# Patient Record
Sex: MALE | Race: WHITE | NOT HISPANIC OR LATINO | Employment: FULL TIME
[De-identification: names, ages, dates, MRNs, and addresses within clinical notes are randomized per-mention and may not be internally consistent; named-entity substitution may affect disease eponyms.]

---

## 2022-02-05 ENCOUNTER — HEALTH MAINTENANCE LETTER (OUTPATIENT)
Age: 37
End: 2022-02-05

## 2022-03-30 ENCOUNTER — OFFICE VISIT (OUTPATIENT)
Dept: FAMILY MEDICINE | Facility: CLINIC | Age: 37
End: 2022-03-30
Payer: COMMERCIAL

## 2022-03-30 VITALS
OXYGEN SATURATION: 96 % | WEIGHT: 183.6 LBS | SYSTOLIC BLOOD PRESSURE: 128 MMHG | HEART RATE: 86 BPM | HEIGHT: 72 IN | BODY MASS INDEX: 24.87 KG/M2 | DIASTOLIC BLOOD PRESSURE: 80 MMHG

## 2022-03-30 DIAGNOSIS — Z11.4 SCREENING FOR HIV (HUMAN IMMUNODEFICIENCY VIRUS): ICD-10-CM

## 2022-03-30 DIAGNOSIS — F10.10 ALCOHOL USE DISORDER, MILD, ABUSE: ICD-10-CM

## 2022-03-30 DIAGNOSIS — Z13.220 SCREENING FOR HYPERLIPIDEMIA: ICD-10-CM

## 2022-03-30 DIAGNOSIS — Z11.59 NEED FOR HEPATITIS C SCREENING TEST: ICD-10-CM

## 2022-03-30 DIAGNOSIS — Z00.00 ROUTINE GENERAL MEDICAL EXAMINATION AT A HEALTH CARE FACILITY: Primary | ICD-10-CM

## 2022-03-30 DIAGNOSIS — Z13.1 SCREENING FOR DIABETES MELLITUS: ICD-10-CM

## 2022-03-30 DIAGNOSIS — F17.210 CIGARETTE NICOTINE DEPENDENCE WITHOUT COMPLICATION: ICD-10-CM

## 2022-03-30 LAB
ANION GAP SERPL CALCULATED.3IONS-SCNC: 9 MMOL/L (ref 5–18)
BUN SERPL-MCNC: 6 MG/DL (ref 8–22)
CALCIUM SERPL-MCNC: 10 MG/DL (ref 8.5–10.5)
CHLORIDE BLD-SCNC: 105 MMOL/L (ref 98–107)
CHOLEST SERPL-MCNC: 177 MG/DL
CO2 SERPL-SCNC: 27 MMOL/L (ref 22–31)
CREAT SERPL-MCNC: 0.87 MG/DL (ref 0.7–1.3)
FASTING STATUS PATIENT QL REPORTED: YES
GFR SERPL CREATININE-BSD FRML MDRD: >90 ML/MIN/1.73M2
GLUCOSE BLD-MCNC: 87 MG/DL (ref 70–125)
HDLC SERPL-MCNC: 70 MG/DL
LDLC SERPL CALC-MCNC: 86 MG/DL
POTASSIUM BLD-SCNC: 4.3 MMOL/L (ref 3.5–5)
SODIUM SERPL-SCNC: 141 MMOL/L (ref 136–145)
TRIGL SERPL-MCNC: 107 MG/DL

## 2022-03-30 PROCEDURE — 80061 LIPID PANEL: CPT | Performed by: FAMILY MEDICINE

## 2022-03-30 PROCEDURE — 99385 PREV VISIT NEW AGE 18-39: CPT | Performed by: FAMILY MEDICINE

## 2022-03-30 PROCEDURE — 80048 BASIC METABOLIC PNL TOTAL CA: CPT | Performed by: FAMILY MEDICINE

## 2022-03-30 PROCEDURE — 36415 COLL VENOUS BLD VENIPUNCTURE: CPT | Performed by: FAMILY MEDICINE

## 2022-03-30 NOTE — PROGRESS NOTES
SUBJECTIVE:   CC: Laron Haro is an 36 year old male who presents for preventative health visit.       Patient has been advised of split billing requirements and indicates understanding: Yes  Healthy Habits:     Getting at least 3 servings of Calcium per day:  NO    Bi-annual eye exam:  Yes    Dental care twice a year:  NO    Sleep apnea or symptoms of sleep apnea:  Daytime drowsiness    Diet:  Regular (no restrictions)    Frequency of exercise:  2-3 days/week    Duration of exercise:  30-45 minutes    Taking medications regularly:  Not Applicable    Medication side effects:  Not applicable    PHQ-2 Total Score: 2    Additional concerns today:  Yes          PROBLEMS TO ADD ON...  Concern about blood pressure    Today's PHQ-2 Score:   PHQ-2 ( 1999 [employer]) 3/27/2022   Q1: Little interest or pleasure in doing things 1   Q2: Feeling down, depressed or hopeless 1   PHQ-2 Score 2   Q1: Little interest or pleasure in doing things Several days   Q2: Feeling down, depressed or hopeless Several days   PHQ-2 Score 2       Abuse: Current or Past(Physical, Sexual or Emotional)- No  Do you feel safe in your environment? Yes    Have you ever done Advance Care Planning? (For example, a Health Directive, POLST, or a discussion with a medical provider or your loved ones about your wishes): No, advance care planning information given to patient to review.  Patient declined advance care planning discussion at this time.    Social History     Tobacco Use     Smoking status: Current Every Day Smoker     Packs/day: 1.00     Types: Cigarettes     Smokeless tobacco: Never Used   Substance Use Topics     Alcohol use: Not on file     Comment: 1 case/week     If you drink alcohol do you typically have >3 drinks per day or >7 drinks per week? Yes        AUDIT - Alcohol Use Disorders Identification Test - Reproduced from the World Health Organization Audit 2001 (Second Edition) 3/27/2022   1.  How often do you have a drink containing  alcohol? 4 or more times a week   2.  How many drinks containing alcohol do you have on a typical day when you are drinking? 3 or 4   3.  How often do you have five or more drinks on one occasion? Weekly   4.  How often during the last year have you found that you were not able to stop drinking once you had started? Never   5.  How often during the last year have you failed to do what was normally expected of you because of drinking? Never   6.  How often during the last year have you needed a first drink in the morning to get yourself going after a heavy drinking session? Never   7.  How often during the last year have you had a feeling of guilt or remorse after drinking? Never   8.  How often during the last year have you been unable to remember what happened the night before because of your drinking? Never   9.  Have you or someone else been injured because of your drinking? No   10. Has a relative, friend, doctor or other health care worker been concerned about your drinking or suggested you cut down? No   TOTAL SCORE 8       Last PSA: No results found for: PSA    Reviewed orders with patient. Reviewed health maintenance and updated orders accordingly - Yes  Labs reviewed in EPIC  BP Readings from Last 3 Encounters:   03/30/22 128/80    Wt Readings from Last 3 Encounters:   03/30/22 83.3 kg (183 lb 9.6 oz)                  There is no problem list on file for this patient.    No past surgical history on file.    Social History     Tobacco Use     Smoking status: Current Every Day Smoker     Packs/day: 1.00     Types: Cigarettes     Smokeless tobacco: Never Used   Substance Use Topics     Alcohol use: Not on file     Comment: 1 csse/week     Family History   Problem Relation Age of Onset     Thyroid Disease Mother      Skin Cancer Mother      Diabetes Father      Lumbar disc disease Father      Osteoarthritis Father          No current outpatient medications on file.     No Known Allergies    Reviewed and updated  as needed this visit by clinical staff   Tobacco  Allergies  Meds              Reviewed and updated as needed this visit by Provider                 No past medical history on file.   No past surgical history on file.    Review of Systems  CONSTITUTIONAL: NEGATIVE for fever, chills, change in weight  INTEGUMENTARY/SKIN: NEGATIVE for worrisome rashes, moles or lesions  EYES: NEGATIVE for vision changes or irritation  ENT: NEGATIVE for ear, mouth and throat problems  RESP: NEGATIVE for significant cough or SOB  CV: NEGATIVE for chest pain, palpitations or peripheral edema  GI: NEGATIVE for nausea, abdominal pain, heartburn, or change in bowel habits   male: negative for dysuria, hematuria, decreased urinary stream, erectile dysfunction, urethral discharge  MUSCULOSKELETAL: NEGATIVE for significant arthralgias or myalgia  NEURO: NEGATIVE for weakness, dizziness or paresthesias  PSYCHIATRIC: NEGATIVE for changes in mood or affect    OBJECTIVE:   /80   Pulse 86   Ht 1.829 m (6')   Wt 83.3 kg (183 lb 9.6 oz)   SpO2 96%   BMI 24.90 kg/m      Physical Exam  GENERAL: healthy, alert and no distress  EYES: Eyes grossly normal to inspection, PERRL and conjunctivae and sclerae normal  HENT: ear canals and TM's normal, nose and mouth without ulcers or lesions  NECK: no adenopathy, no asymmetry, masses, or scars and thyroid normal to palpation  RESP: lungs clear to auscultation - no rales, rhonchi or wheezes  CV: regular rate and rhythm, normal S1 S2, no S3 or S4, no murmur, click or rub, no peripheral edema and peripheral pulses strong  ABDOMEN: soft, nontender, no hepatosplenomegaly, no masses and bowel sounds normal  MS: no gross musculoskeletal defects noted, no edema  SKIN: no suspicious lesions or rashes  NEURO: Normal strength and tone, mentation intact and speech normal  PSYCH: mentation appears normal, affect normal/bright  PSYCH: mentation appears normal, affect normal/bright and PHQ 2 score is 2 and  AUDIT score 8    Diagnostic Test Results:  Labs reviewed in Epic  Results for orders placed or performed in visit on 03/30/22   Lipid panel reflex to direct LDL Fasting     Status: None   Result Value Ref Range    Cholesterol 177 <=199 mg/dL    Triglycerides 107 <=149 mg/dL    Direct Measure HDL 70 >=40 mg/dL    LDL Cholesterol Calculated 86 <=129 mg/dL    Patient Fasting > 8hrs? Yes    Basic metabolic panel     Status: Abnormal   Result Value Ref Range    Sodium 141 136 - 145 mmol/L    Potassium 4.3 3.5 - 5.0 mmol/L    Chloride 105 98 - 107 mmol/L    Carbon Dioxide (CO2) 27 22 - 31 mmol/L    Anion Gap 9 5 - 18 mmol/L    Urea Nitrogen 6 (L) 8 - 22 mg/dL    Creatinine 0.87 0.70 - 1.30 mg/dL    Calcium 10.0 8.5 - 10.5 mg/dL    Glucose 87 70 - 125 mg/dL    GFR Estimate >90 >60 mL/min/1.73m2       ASSESSMENT/PLAN:   Laron was seen today for physical.    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 [employer]  Steady girlfriend  Likes to golf  2 energy drinks/day  Job is stressful  Wanted to wait on Tdap    Cigarette nicotine dependence without complication  1 ppd  Contemplating quitting but no plan    Screening for HIV (human immunodeficiency virus)  Declined doing now    Need for hepatitis C screening test  Declined doing now    Screening for hyperlipidemia  -     Lipid panel reflex to direct LDL Fasting, 177/107/70/86  satisfactory    Screening for diabetes mellitus  -     Basic metabolic panel, BG 87 with GFR > 90  No diabetes    Alcohol use disorder, mild, abuse  AUDIT score 8 consistent with harmful ETOH use  Encourage to cut back  1 case beer/week      Patient has been advised of split billing requirements and indicates understanding: Yes    COUNSELING:   Reviewed preventive health counseling, as reflected in patient instructions       Regular exercise        Healthy diet/nutrition       Alcohol Use        Tobacco use    Estimated body mass index is 24.9 kg/m  as calculated from the following:    Height as of this encounter: 1.829 m (6').    Weight as of this encounter: 83.3 kg (183 lb 9.6 oz).         He reports that he has been smoking cigarettes. He has been smoking about 1.00 pack per day. He has never used smokeless tobacco.  Tobacco Cessation Action Plan:   Information offered: Patient not interested at this time           Jon Lozano MD  [facility]

## 2022-04-01 NOTE — RESULT ENCOUNTER NOTE
Cameron,  It was nice to have the opportunity to meet you in the clinic this week.  I would like to go over your test results.    The tests included a lipid panel which looked excellent.  The LDL cholesterol is the bad cholesterol and would like to see that less than 100 and your value was 86.  The HDL cholesterol is the good cholesterol and 70 is a very good number.  Triglycerides and total cholesterol were normal as well.  No concerns with these values.    The metabolic panel shows that your electrolytes are normal including calcium.  The blood glucose is a screening test for diabetes and 87 is a normal value.  The GFR looks at your kidney function and a value greater than 90 is evidence for normal kidney function.  The low urea nitrogen essentially means that you are staying well-hydrated.    My only recommendations would be to come up with a plan to quit smoking which we would be happy to help you with and also to try to cut back some on your alcohol use.  As a general rule for men it is felt that 2 beers daily or a total of 14 beers per week would be the upper limit of not being harmful.    It would be good to return in a year for another preventative health care visit.    Best regards,Dr. Lozano

## 2022-10-01 ENCOUNTER — HEALTH MAINTENANCE LETTER (OUTPATIENT)
Age: 37
End: 2022-10-01

## 2022-12-26 ENCOUNTER — OFFICE VISIT (OUTPATIENT)
Dept: FAMILY MEDICINE | Facility: CLINIC | Age: 37
End: 2022-12-26
Payer: COMMERCIAL

## 2022-12-26 VITALS
WEIGHT: 178.5 LBS | DIASTOLIC BLOOD PRESSURE: 91 MMHG | RESPIRATION RATE: 18 BRPM | OXYGEN SATURATION: 98 % | SYSTOLIC BLOOD PRESSURE: 142 MMHG | BODY MASS INDEX: 24.21 KG/M2 | HEART RATE: 82 BPM | TEMPERATURE: 98.5 F

## 2022-12-26 DIAGNOSIS — R07.89 CHEST PRESSURE: Primary | ICD-10-CM

## 2022-12-26 PROCEDURE — 99213 OFFICE O/P EST LOW 20 MIN: CPT | Performed by: NURSE PRACTITIONER

## 2022-12-26 PROCEDURE — 93010 ELECTROCARDIOGRAM REPORT: CPT | Performed by: INTERNAL MEDICINE

## 2022-12-26 PROCEDURE — 93005 ELECTROCARDIOGRAM TRACING: CPT | Performed by: NURSE PRACTITIONER

## 2022-12-26 RX ORDER — MAGNESIUM HYDROXIDE/ALUMINUM HYDROXICE/SIMETHICONE 120; 1200; 1200 MG/30ML; MG/30ML; MG/30ML
15 SUSPENSION ORAL ONCE
Status: COMPLETED | OUTPATIENT
Start: 2022-12-26 | End: 2022-12-26

## 2022-12-26 RX ADMIN — MAGNESIUM HYDROXIDE/ALUMINUM HYDROXICE/SIMETHICONE 15 ML: 120; 1200; 1200 SUSPENSION ORAL at 19:57

## 2022-12-26 ASSESSMENT — ENCOUNTER SYMPTOMS
DIZZINESS: 0
DIAPHORESIS: 0
SHORTNESS OF BREATH: 0
COUGH: 0

## 2022-12-27 LAB
ATRIAL RATE - MUSE: 75 BPM
DIASTOLIC BLOOD PRESSURE - MUSE: NORMAL MMHG
INTERPRETATION ECG - MUSE: NORMAL
P AXIS - MUSE: 62 DEGREES
PR INTERVAL - MUSE: 166 MS
QRS DURATION - MUSE: 88 MS
QT - MUSE: 400 MS
QTC - MUSE: 446 MS
R AXIS - MUSE: 64 DEGREES
SYSTOLIC BLOOD PRESSURE - MUSE: NORMAL MMHG
T AXIS - MUSE: 33 DEGREES
VENTRICULAR RATE- MUSE: 75 BPM

## 2022-12-27 NOTE — PATIENT INSTRUCTIONS
I do not believe this represents a serious problem.  It could be acid reflux or muscle related.    You can try famotidine/Pepcid AC over-the-counter twice daily for the next 2 weeks if it seems to be helping.   If after 2 weeks,  symptoms return, he can continue on this and see your doctor for recheck.    Tylenol 1000 mg 3 times daily as needed, heating pads to your chest.    See acid reflux handout.  Avoid greasy, spicy food, tomatoes, alcohol until better.

## 2022-12-27 NOTE — PROGRESS NOTES
Assessment & Plan     Chest pressure    - EKG 12-lead, tracing only  - alum & mag hydroxide-simethicone (MAALOX) suspension 15 mL   -OTC Pepcid AC twice daily for up to 2 weeks and then reevaluate need  -Tylenol, heating pad    Patient with small/specific area of nonexertional chest pressure currently rated 2 out of 10.  Comes and goes.  Somewhat tender to touch.  Is not related to eating or activity per patient.    EKG is normal.  Patient given test dose of Maalox which she said actually improved his symptoms noticeably.    Patient admits to feeling very stressed lately which could be contributing.     Borderline elevated blood pressure, but patient is quite anxious about possibility of having a heart issue.  Patient was reassured that this is unlikely to be heart issue based on our discussion, EKG, and exam description of features of more cardiac chest pain.     Recheck if no improvement in 10 days to 2 weeks or sooner if worsening.    Reviewed handout for lifestyle changes for GERD and noncardiac causes of chest pain.          No follow-ups on file.    Mabel Espino [facility]    Eric Barnes is a 37 year old male who presents to clinic today for the following health issues:  Chief Complaint   Patient presents with     Chest Pain     Pt states started 1 week left side chest pain      HPI  Patient is a 1 pack/day smoker with reported circumscribed area of squeezing located in left chest.    No shortness of breath.      Comes and goes    Not related to eating or activity.  Denies associated symptoms such as dizziness, diaphoresis, URI symptoms or recent cough.     EKG done prior to my arrival in the room was normal.    Having rt trapezius muscle strain.      Works at [employer] and describes his job is high stress.          Review of Systems   Constitutional: Negative for diaphoresis.   Respiratory: Negative for cough and shortness of breath.    Neurological: Negative for  dizziness.           Objective    BP (!) 142/91 (BP Location: Right arm, Patient Position: Sitting, Cuff Size: Adult Regular)   Pulse 82   Temp 98.5  F (36.9  C) (Oral)   Resp 18   Wt 81 kg (178 lb 8 oz)   SpO2 98%   BMI 24.21 kg/m    Physical Exam  Constitutional:       Appearance: He is well-developed.   HENT:      Right Ear: External ear normal.      Left Ear: External ear normal.   Eyes:      General:         Right eye: No discharge.         Left eye: No discharge.      Conjunctiva/sclera: Conjunctivae normal.   Cardiovascular:      Rate and Rhythm: Normal rate and regular rhythm.      Pulses: Normal pulses.      Heart sounds: Normal heart sounds.   Pulmonary:      Effort: Pulmonary effort is normal.      Breath sounds: Normal breath sounds.   Chest:          Comments: Approximate area of circumscribed tenderness and discomfort  Abdominal:      Tenderness: There is no abdominal tenderness.   Musculoskeletal:         General: Normal range of motion.   Skin:     General: Skin is warm.   Neurological:      Mental Status: He is alert and oriented to person, place, and time.   Psychiatric:         Mood and Affect: Mood normal.         Behavior: Behavior normal.         Thought Content: Thought content normal.         Judgment: Judgment normal.      Comments: Anxious, pleasant demeanor          EKG read by Mabel Espino CNP: NSR     Results for orders placed or performed in visit on 12/26/22 (from the past 24 hour(s))   EKG 12-lead, tracing only   Result Value Ref Range    Systolic Blood Pressure  mmHg    Diastolic Blood Pressure  mmHg    Ventricular Rate 75 BPM    Atrial Rate 75 BPM    ME Interval 166 ms    QRS Duration 88 ms     ms    QTc 446 ms    P Axis 62 degrees    R AXIS 64 degrees    T Axis 33 degrees    Interpretation ECG       Sinus rhythm  Normal ECG  No previous ECGs available

## 2023-05-14 ENCOUNTER — HEALTH MAINTENANCE LETTER (OUTPATIENT)
Age: 38
End: 2023-05-14

## 2023-06-15 ENCOUNTER — OFFICE VISIT (OUTPATIENT)
Dept: FAMILY MEDICINE | Facility: CLINIC | Age: 38
End: 2023-06-15
Payer: COMMERCIAL

## 2023-06-15 VITALS
BODY MASS INDEX: 25.09 KG/M2 | RESPIRATION RATE: 16 BRPM | WEIGHT: 185 LBS | SYSTOLIC BLOOD PRESSURE: 131 MMHG | OXYGEN SATURATION: 98 % | DIASTOLIC BLOOD PRESSURE: 81 MMHG | HEART RATE: 65 BPM | TEMPERATURE: 98 F

## 2023-06-15 DIAGNOSIS — M25.562 ACUTE PAIN OF LEFT KNEE: Primary | ICD-10-CM

## 2023-06-15 PROCEDURE — 99213 OFFICE O/P EST LOW 20 MIN: CPT | Performed by: NURSE PRACTITIONER

## 2023-06-15 NOTE — LETTER
Ping 15, 2023      Laron Haro  [address] [place] [id number]        To Whom It May Concern:    Laron Haro was seen in our clinic. He may return to work with the following: limited to 4-5 hour workday on or about June 17.  He may need an alternative work assignment.  He should be seated most of his shift and may walk to and from his work area with minimal additional walking due to a knee injury.     Note is valid for 1 week or until he is seen in follow-up for persistent symptoms.      Sincerely,        Mabel Espino, CNP

## 2023-06-15 NOTE — PROGRESS NOTES
"Assessment & Plan     Acute pain of left knee    - Physical Therapy Referral       Left anterior medial knee pain and swelling ongoing for the past 10 days.  Denies any known injury/trauma.  No erythema, history of gout, fevers.  Locally swollen in the area of discomfort.  Denies previous history of left knee trauma and/or surgeries.  Reports pain 0 out of 10 with rest; however, he reports getting an 8 out of 10 pain with ambulation and certain movements.  Recently felt much better after reducing his activity at work and then symptoms recurred after doing his usual 10-hour shifts with 11 miles of walking per day.    Tenderness noted on palpation of MCL and medial meniscus. Negative for medial or lateral instability of knee. Negative McMurrary test.  Pain likely secondary to overuse on knee.     Ace bandage applied in clinic.  Advised to RICE affected area.     Work letter provided stating patient can return to work 6/17 with light duty work restrictions.  PT referral provided.     Advised to follow-up with PCP in 1 week if symptoms persist not improved.  Advised to return sooner if symptoms worsen.                 Return in about 1 week (around 6/22/2023).    Mabel Espino, [facility] ASHLEY Barnes is a 37 year old male who presents to clinic today for the following health issues:  Chief Complaint   Patient presents with     Knee Pain     Lt knee x 10 day.     HPI    Left knee pain ongoing for the past 10 days. Reports going to to the beach and then waking the next day with knee pain. No known injury. Reports pain progressively worsening over the past 4 days. Pain rates 0/10 with rest, but increases to an 8/10 \"stabbing pain\" with walking and flexion of knee.  States \" my knee feels tight when I bend it\"    Went golfing Sunday. States knee did not hurt while golfing; however, pain significantly worsened the next day.    No sudden worsening of pain.    Denies previous knee " injury and surgery.    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 States he has been limping a lot of work.  Says his usual days around 10-hour shifts with 20,000 steps per day.    Has been taking Advil for pain, Last dose this morning around 10am.  States he does not get any relief from Advil. Has also tried icey hot with no relief.      Has a elastic knee brace that is very old that he tried at home which he did not think helped his symptoms.         Review of Systems  See HPI.      Objective    /81   Pulse 65   Temp 98  F (36.7  C) (Oral)   Resp 16   Wt 83.9 kg (185 lb)   SpO2 98%   BMI 25.09 kg/m       Physical Exam  Constitutional:       General: He is not in acute distress.     Appearance: He is normal weight.   HENT:      Head: Normocephalic and atraumatic.      Mouth/Throat:      Mouth: Mucous membranes are moist.   Eyes:      Pupils: Pupils are equal, round, and reactive to light.   Pulmonary:      Effort: Pulmonary effort is normal.   Musculoskeletal:      Right knee: Normal. No swelling or deformity.      Left knee: Swelling (Anterior medial knee ) present. No deformity, effusion, erythema, lacerations or bony tenderness. No tenderness. No LCL laxity, MCL laxity, ACL laxity or PCL laxity.Normal alignment and normal patellar mobility. Normal pulse.      Instability Tests: Medial Lacy test negative and lateral Lacy test negative.        Legs:       Comments: Tenderness on palpation of MCL and medial mensicus.     Normal range of motion with extension.       Skin:     General: Skin is warm.      Capillary Refill: Capillary refill takes less than 2 seconds.      Findings: No erythema.   Neurological:      General: No focal deficit present.      Mental Status: He is alert and oriented to person, place, and time.      Gait: Gait abnormal (Limping ).   Psychiatric:         Attention and Perception: Attention normal.         Mood and Affect: Mood normal.         Speech: Speech normal.          Behavior: Behavior normal. Behavior is cooperative.         Thought Content: Thought content normal.

## 2023-06-15 NOTE — PATIENT INSTRUCTIONS
You are having some knee pain in the area of your MCL and medial meniscus.      Most likely due to overuse.    Focus on icing for the next 2 days.  20 minutes at a time 4-6 times per day.    Rest as much as possible while at home.  Short walks okay.      Wear ACE wrap for swelling and support.      Off tomorrow.  Start light duty with minimal walking at work and half days.     PT when available.  Follow-up with your doctor after 1 week.

## 2023-06-23 ENCOUNTER — OFFICE VISIT (OUTPATIENT)
Dept: ORTHOPEDICS | Facility: CLINIC | Age: 38
End: 2023-06-23
Payer: COMMERCIAL

## 2023-06-23 ENCOUNTER — ANCILLARY PROCEDURE (OUTPATIENT)
Dept: GENERAL RADIOLOGY | Facility: CLINIC | Age: 38
End: 2023-06-23
Attending: FAMILY MEDICINE
Payer: COMMERCIAL

## 2023-06-23 VITALS
DIASTOLIC BLOOD PRESSURE: 116 MMHG | BODY MASS INDEX: 25.09 KG/M2 | WEIGHT: 185 LBS | SYSTOLIC BLOOD PRESSURE: 158 MMHG | HEART RATE: 92 BPM

## 2023-06-23 DIAGNOSIS — M25.562 ACUTE PAIN OF LEFT KNEE: Primary | ICD-10-CM

## 2023-06-23 DIAGNOSIS — M25.562 LEFT KNEE PAIN: ICD-10-CM

## 2023-06-23 PROCEDURE — 99204 OFFICE O/P NEW MOD 45 MIN: CPT | Performed by: FAMILY MEDICINE

## 2023-06-23 PROCEDURE — 73562 X-RAY EXAM OF KNEE 3: CPT | Mod: TC | Performed by: RADIOLOGY

## 2023-06-23 NOTE — PATIENT INSTRUCTIONS
# Acute Worsening Left Knee Pain: Laron Haro was seen today for acute worsening left knee pain. Symptoms had been going on for one month pain worsening limiting his ability to work/walk. On examination there are positive findings of tenderness to palpation over the medial joint line, small joint effusion. Imaging findings showed small joint effusion, no fracture/arthritis. Likely cause of patient's condition due to meniscal injury. Other possible conditions contributing to symptoms include stress injury.  Counseled patient on nature of condition and treatment options.  Given this plan as below, follow-up in clinic after left knee MRI ordered today     Image Findings: small knee joint effusion, no fracture, no arthritis  Treatment: Activities as tolerated, crutches given today  Job: Letter written for restrictions   Medications/Injections: Limited tylenol/ibuprofen for pain for 1-2 weeks, none  Follow-up: 2 weeks after left knee MRI to go over the results    MRI Scheduling Appointments  [phone] [place]   [phone] [place]     Please call [phone]   Ask for my team if you have any questions or concerns    If you have not yet received the influenza vaccine but would like to get one, please call  [phone] or you can schedule via [facility]    It was great seeing you today!    Trevon Clarke MD, CAM

## 2023-06-23 NOTE — LETTER
June 23, 2023      Laron Haro  [address] [place] [id number]        To Whom It May Concern:    Laron Haro was seen in our clinic. He may return to work with the following: Shifts no more than 5 hours per day. Please allow use of crutches at all times. No ladders/climbing.   Continue restrictions for 3 weeks.      Sincerely,        Trevon Clarke MD

## 2023-06-23 NOTE — Clinical Note
6/23/2023         RE: Laron Haro  [address] [place] [address]        Dear Colleague,    Thank you for referring your patient, Laron Haro, to the [facility] SPORTS MEDICINE [facility]. Please see a copy of my visit note below.    ASSESSMENT & PLAN    There are no diagnoses linked to this encounter.  This issue is acute and Worsening.         Trevon Clarke MD  [facility] SPORTS MEDICINE [facility]    -----  Chief Complaint   Patient presents with     Left Knee - Pain       SUBJECTIVE  Laron Haro is a/an 37 year old male who is seen as a self referral for evaluation of left knee pain.     The patient is seen by themselves.      Onset: 6/5/23, 2.5 week(s) ago had some mild pain about one mon ago. Reports insidious onset without acute precipitating event. Saw PCP 6/15/23. No new injury. Pain worse after golfing  Location of Pain: left medial knee   Worsened by: walking, external rotation   Better with: rest   Treatments tried:  rest, ice, soaking in epsom salt baths, work restrictions   Associated symptoms: stiffness, pain     Orthopedic/Surgical history: NO  Social History/Occupation: [employer]     No family history pertinent to patient's problem today.      REVIEW OF SYSTEMS:  Review of Systems  Constitutional, HEENT, cardiovascular, pulmonary, gi and gu systems are negative, except as otherwise noted.    OBJECTIVE:  Wt 83.9 kg (185 lb)   BMI 25.09 kg/m     General: healthy, alert and in no distress  HEENT: no scleral icterus or conjunctival erythema  Skin: no suspicious lesions or rash. No jaundice.  CV: distal perfusion intact    Resp: normal respiratory effort without conversational dyspnea   Psych: normal mood and affect  Gait: limping on left leg  Neuro: Normal light sensory exam of left lower extremity     Ortho Exam   LEFT KNEE  Inspection:    Normal alignment; no edema, erythema, or ecchymosis present  Palpation:    Tender  "about the {FSOC KNEE TENDERNESS:076832:a}. Remainder of bony and ligamentous landmarks are nontender.    {Effusion Size:308497} effusion is present    Patellofemoral crepitus is {PRESENT:874401::\"Present\"}  Range of Motion:     {FSOC ROM KNEE:166465}0 extension to {FSOC ROM KNEE:147621}0 flexion  Strength:    {FSOC KNEE STRENGTH REV:587471}    Extensor mechanism intact  Special Tests:    Positive: {FSOC KNEE TESTS REVISED:451203}    Negative: {FSOC KNEE TESTS REVISED:653643}      RADIOLOGY:  I independently ordered, visualized and reviewed these images with the patient  No fracture or arthritis over left knee      Review of external notes as documented elsewhere in note  Review of the result(s) of each unique test - left knee x-rays       Disclaimer: This note consists of symbols derived from keyboarding, dictation and/or voice recognition software. As a result, there may be errors in the script that have gone undetected. Please consider this when interpreting information found in this chart.      ASSESSMENT & PLAN    Laron was seen today for pain.    Diagnoses and all orders for this visit:    Acute pain of left knee  -     XR Knee Standing AP [place] Bilat Lat Left; Future  -     MR Knee Left w/o Contrast; Future      This issue is acute and Worsening.    # Acute Worsening Left Knee Pain: Laron Haro was seen today for acute worsening left knee pain. Symptoms had been going on for one month pain worsening limiting his ability to work/walk. On examination there are positive findings of tenderness to palpation over the medial joint line, small joint effusion. Imaging findings showed small joint effusion, no fracture/arthritis. Likely cause of patient's condition due to meniscal injury. Other possible conditions contributing to symptoms include stress injury.  Counseled patient on nature of condition and treatment options.  Given this plan as below, follow-up in clinic after left knee MRI ordered today   "   Image Findings: small knee joint effusion, no fracture, no arthritis  Treatment: Activities as tolerated, crutches given today  Job: Letter written for restrictions   Medications/Injections: Limited tylenol/ibuprofen for pain for 1-2 weeks, none  Follow-up: 2 weeks after left knee MRI to go over the results     Trevon Clarke MD  [facility] SPORTS MEDICINE [facility]    -----  Chief Complaint   Patient presents with     Left Knee - Pain       SUBJECTIVE  Laron Haro is a/an 37 year old male who is seen as a self referral for evaluation of left knee pain.     The patient is seen by themselves.      Onset: 6/5/23, 2.5 week(s) ago had some mild pain about one mon ago. Reports insidious onset without acute precipitating event. Saw PCP 6/15/23. No new injury. Pain worse after golfing  Location of Pain: left medial knee   Worsened by: walking, external rotation   Better with: rest   Treatments tried:  rest, ice, soaking in epsom salt baths, work restrictions   Associated symptoms: stiffness, pain     Orthopedic/Surgical history: NO  Social History/Occupation: [employer]     No family history pertinent to patient's problem today.      REVIEW OF SYSTEMS:  Review of Systems  Constitutional, HEENT, cardiovascular, pulmonary, gi and gu systems are negative, except as otherwise noted.    OBJECTIVE:  BP (!) 158/116   Pulse 92   Wt 83.9 kg (185 lb)   BMI 25.09 kg/m     General: healthy, alert and in no distress  HEENT: no scleral icterus or conjunctival erythema  Skin: no suspicious lesions or rash. No jaundice.  CV: distal perfusion intact    Resp: normal respiratory effort without conversational dyspnea   Psych: normal mood and affect  Gait: limping on left leg  Neuro: Normal light sensory exam of left lower extremity     Ortho Exam   LEFT KNEE  Inspection:    Normal alignment; no edema, erythema, or ecchymosis present  Palpation:    Tender about the {FSOC KNEE TENDERNESS:647902:a}.  "Remainder of bony and ligamentous landmarks are nontender.    {Effusion Size:186110} effusion is present    Patellofemoral crepitus is {PRESENT:093188::\"Present\"}  Range of Motion:     {FSOC ROM KNEE:106062}0 extension to {FSOC ROM KNEE:465661}0 flexion  Strength:    {FSOC KNEE STRENGTH REV:717637}    Extensor mechanism intact  Special Tests:    Positive: {FSOC KNEE TESTS REVISED:212391}    Negative: {FSOC KNEE TESTS REVISED:662904}      RADIOLOGY:  I independently ordered, visualized and reviewed these images with the patient  No fracture or arthritis over left knee      Review of external notes as documented elsewhere in note  Review of the result(s) of each unique test - left knee x-rays       Disclaimer: This note consists of symbols derived from keyboarding, dictation and/or voice recognition software. As a result, there may be errors in the script that have gone undetected. Please consider this when interpreting information found in this chart.        Again, thank you for allowing me to participate in the care of your patient.        Sincerely,        Trevon Clarke MD  "

## 2023-06-23 NOTE — PROGRESS NOTES
ASSESSMENT & PLAN    Laron was seen today for pain.    Diagnoses and all orders for this visit:    Acute pain of left knee  -     XR Knee Standing AP [place] Bilat Lat Left; Future  -     MR Knee Left w/o Contrast; Future      This issue is acute and Worsening.    # Acute Worsening Left Knee Pain: Laron Haro was seen today for acute worsening left knee pain. Symptoms had been going on for one month pain worsening limiting his ability to work/walk. On examination there are positive findings of tenderness to palpation over the medial joint line, small joint effusion. Imaging findings showed small joint effusion, no fracture/arthritis. Likely cause of patient's condition due to meniscal injury. Other possible conditions contributing to symptoms include stress injury.  Counseled patient on nature of condition and treatment options.  Given this plan as below, follow-up in clinic after left knee MRI ordered today     Image Findings: small knee joint effusion, no fracture, no arthritis  Treatment: Activities as tolerated, crutches given today  Job: Letter written for restrictions   Medications/Injections: Limited tylenol/ibuprofen for pain for 1-2 weeks, none  Follow-up: 2 weeks after left knee MRI to go over the results     Trevon Clarke MD  [facility] [place]    -----  Chief Complaint   Patient presents with     Left Knee - Pain       SUBJECTIVE  Laron Haro is a/an 37 year old male who is seen as a self referral for evaluation of left knee pain.     The patient is seen by themselves.      Onset: 6/5/23, 2.5 week(s) ago had some mild pain about one mon ago. Reports insidious onset without acute precipitating event. Saw PCP 6/15/23. No new injury. Pain worse after golfing  Location of Pain: left medial knee   Worsened by: walking, external rotation   Better with: rest   Treatments tried:  rest, ice, soaking in epsom salt baths, work restrictions   Associated  symptoms: stiffness, pain     Orthopedic/Surgical history: NO  Social History/Occupation: [employer]     No family history pertinent to patient's problem today.      REVIEW OF SYSTEMS:  Review of Systems  Constitutional, HEENT, cardiovascular, pulmonary, gi and gu systems are negative, except as otherwise noted.    OBJECTIVE:  BP (!) 158/116   Pulse 92   Wt 83.9 kg (185 lb)   BMI 25.09 kg/m     General: healthy, alert and in no distress  HEENT: no scleral icterus or conjunctival erythema  Skin: no suspicious lesions or rash. No jaundice.  CV: distal perfusion intact    Resp: normal respiratory effort without conversational dyspnea   Psych: normal mood and affect  Gait: limping on left leg  Neuro: Normal light sensory exam of left lower extremity     Ortho Exam   LEFT KNEE  Inspection:    Normal alignment; no edema, erythema, or ecchymosis present  Palpation:    Tender about the medial joint line. Remainder of bony and ligamentous landmarks are nontender.    Small effusion is present    Patellofemoral crepitus is Absent  Range of Motion:     00 extension to 1350 flexion  Strength:    Quadriceps 5/5, hamstrings 5/5, gastrocsoleus 5/5 and tibialis anterior 5/5    Extensor mechanism intact  Special Tests:    Positive: None    Negative: Patellar grind, MCL/valgus stress (0 & 30 deg), LCL/varus stress (0 & 30 deg), Lachman's, anterior drawer, posterior drawer      RADIOLOGY:  I independently ordered, visualized and reviewed these images with the patient  No fracture or arthritis over left knee      Review of external notes as documented elsewhere in note  Review of the result(s) of each unique test - left knee x-rays       Disclaimer: This note consists of symbols derived from keyboarding, dictation and/or voice recognition software. As a result, there may be errors in the script that have gone undetected. Please consider this when interpreting information found in this chart.

## 2023-06-26 ENCOUNTER — TELEPHONE (OUTPATIENT)
Dept: ORTHOPEDICS | Facility: CLINIC | Age: 38
End: 2023-06-26
Payer: COMMERCIAL

## 2023-06-26 NOTE — TELEPHONE ENCOUNTER
Called and spoke with patient.  I explained that the forms were received today, completed and faxed to [employer].  He expressed understanding.    Mabel Bello ATC

## 2023-06-26 NOTE — TELEPHONE ENCOUNTER
[facility] Call Center    Phone Message    May a detailed message be left on voicemail: yes     Reason for Call: Other: Patient faxed over Medical absence form on 06/25/23 and would like a call back to confirm that it has been received. Once complete, he would like Dr. Clarke to fax it to # [phone]. Please call and advise.     Action Taken: Other: [place] [facility]    Travel Screening: Not Applicable

## 2023-06-27 ENCOUNTER — TELEPHONE (OUTPATIENT)
Dept: ORTHOPEDICS | Facility: CLINIC | Age: 38
End: 2023-06-27
Payer: COMMERCIAL

## 2023-06-27 NOTE — TELEPHONE ENCOUNTER
Patient is currently scheduled for his MRI on 6/29/23 for his knee, and is asking for Dr. Clarke's care team to fax his MRI order to [facility] instead, per his insurance coverage. Patient did not have a fax number, but is hoping to have his MRI done at the [address] [place] [address] location. Patient is asking for this to be faxed as soon as possible.    Thank you!

## 2023-06-28 ENCOUNTER — TELEPHONE (OUTPATIENT)
Dept: ORTHOPEDICS | Facility: CLINIC | Age: 38
End: 2023-06-28
Payer: COMMERCIAL

## 2023-06-28 NOTE — TELEPHONE ENCOUNTER
Telephone Call:    Criss from [employer]  Return # [phone]    [facility] paperwork question.    Regarding Laron Tahir  1985    ?#8 on form-    Need clarification on start date end date for 5 hours (reduced work restrictions) please include # of days work is limited.

## 2023-06-29 ENCOUNTER — TELEPHONE (OUTPATIENT)
Dept: ORTHOPEDICS | Facility: CLINIC | Age: 38
End: 2023-06-29
Payer: COMMERCIAL

## 2023-06-29 NOTE — TELEPHONE ENCOUNTER
Updated letter sent to patient via MyChart and also faxed to [employer].  Sent patient MyChart as well.    Mabel Bello, ATC

## 2023-06-29 NOTE — TELEPHONE ENCOUNTER
Hello,  I'm with ortho con.  Pt of Dr. Clarke.  Pt is looking to get a letter sent to [employer] at faxed . Pt is asking to have his limited work schedule be extended until his f/u visit with Dr. Clarke with MRI results.  Thank you.

## 2023-06-29 NOTE — LETTER
June 29, 2023      Laron Haro  [address] [place] [id number]        To Whom It May Concern:    Laron Haro was seen in our clinic. He may return to work with the following: Shifts no more than 5 hours per day. Please allow use of crutches at all times. No ladders/climbing.   Will update restrictions at office visit on 7/17/23.       Sincerely,        Trevon Clarke MD

## 2023-07-05 ENCOUNTER — TRANSFERRED RECORDS (OUTPATIENT)
Dept: HEALTH INFORMATION MANAGEMENT | Facility: CLINIC | Age: 38
End: 2023-07-05
Payer: COMMERCIAL

## 2023-07-10 ENCOUNTER — OFFICE VISIT (OUTPATIENT)
Dept: ORTHOPEDICS | Facility: CLINIC | Age: 38
End: 2023-07-10
Payer: COMMERCIAL

## 2023-07-10 VITALS — BODY MASS INDEX: 25.09 KG/M2 | WEIGHT: 185 LBS

## 2023-07-10 DIAGNOSIS — S83.412D SPRAIN OF MEDIAL COLLATERAL LIGAMENT OF LEFT KNEE, SUBSEQUENT ENCOUNTER: Primary | ICD-10-CM

## 2023-07-10 DIAGNOSIS — S83.222D PERIPHERAL TEAR OF MEDIAL MENISCUS OF LEFT KNEE AS CURRENT INJURY, SUBSEQUENT ENCOUNTER: ICD-10-CM

## 2023-07-10 PROCEDURE — 99213 OFFICE O/P EST LOW 20 MIN: CPT | Performed by: FAMILY MEDICINE

## 2023-07-10 ASSESSMENT — PAIN SCALES - GENERAL: PAINLEVEL: MODERATE PAIN (5)

## 2023-07-10 NOTE — PATIENT INSTRUCTIONS
# Acute Worsening Left Knee Pain: Laron Haro was seen today for follow-up no acute worsening left knee pain. Symptoms had been going on for one month pain worsening limiting his ability to work/walk. On examination there are positive findings of tenderness to palpation over the medial joint line, small joint effusion.  Reviewed MRI with patient showing nondisplaced medial meniscal tear, MCL sprain both of which likely contributing to his pain.  Overall condition improving and has physical therapy scheduled this week.  Given this we will extend work restrictions, continue physical therapy and follow-up in 3 weeks for repeat evaluation.  Plan otherwise as below.       Image Findings: Horizontal medial meniscus tear, nondisplaced, slight sprain of the MCL  Treatment: Activities as tolerated, continue physical therapy  Job: Letter written for restrictions extended for 3 more weeks  Medications/Injections: Limited tylenol/ibuprofen for pain for 1-2 weeks, none  Follow-up: 3 weeks for repeat evaluation and clearance for work    Please call [phone]   Ask for my team if you have any questions or concerns    If you have not yet received the influenza vaccine but would like to get one, please call  [phone] or you can schedule via [employer]    It was great seeing you again today!    Trevon Clarke MD, [facility]

## 2023-07-10 NOTE — LETTER
July 10, 2023      Laron Haro  [address] [place] [id number]        To Whom It May Concern:    Laron Haor was seen in our clinic. He may return to work with the following: Shifts no more than 5 hours per day.  He will be going to PT in the interim. Will update restrictions at office visit on 7/31/23.          Sincerely,        Trevon Clarke MD

## 2023-07-10 NOTE — LETTER
7/10/2023         RE: Laron Haro  [address] [facility] [address]        Dear Colleague,    Thank you for referring your patient, Laron Haro, to the [facility]IC[facility] [place]. Please see a copy of my visit note below.    ASSESSMENT & PLAN    Laron was seen today for pain.    Diagnoses and all orders for this visit:    Sprain of medial collateral ligament of left knee, subsequent encounter    Peripheral tear of medial meniscus of left knee as current injury, subsequent encounter        # Acute Worsening Left Knee Pain: Laron Haro was seen today for follow-up no acute worsening left knee pain. Symptoms had been going on for one month pain worsening limiting his ability to work/walk. On examination there are positive findings of tenderness to palpation over the medial joint line, small joint effusion.  Reviewed MRI with patient showing nondisplaced medial meniscal tear, MCL sprain both of which likely contributing to his pain.  Overall condition improving and has physical therapy scheduled this week.  Given this we will extend work restrictions, continue physical therapy and follow-up in 3 weeks for repeat evaluation.  Plan otherwise as below.       Image Findings: Horizontal medial meniscus tear, nondisplaced, slight sprain of the MCL  Treatment: Activities as tolerated, continue physical therapy  Job: Letter written for restrictions extended for 3 more weeks  Medications/Injections: Limited tylenol/ibuprofen for pain for 1-2 weeks, none  Follow-up: 3 weeks for repeat evaluation and clearance for work    -----    SUBJECTIVE:  Laron Haro is a 37 year old male who is seen in follow-up for left knee pain. They were last seen 6/23/2023.  The patient is seen by themselves.    Since their last visit reports persisting left knee pain.  Here to review left knee MRI performed on 7/5/23 at [facility]. They indicate that their current pain  level is mild. They have tried crutches, rest, activity avoidance.        Patient's past medical, surgical, social, and family histories were reviewed today and no changes are noted.    REVIEW OF SYSTEMS:  Constitutional: NEGATIVE for fever, chills, change in weight  Skin: NEGATIVE for worrisome rashes, moles or lesions  GI/: NEGATIVE for bowel or bladder changes  Neuro: NEGATIVE for weakness, dizziness or paresthesias    OBJECTIVE:  Wt 83.9 kg (185 lb)   BMI 25.09 kg/m     General: healthy, alert and in no distress  HEENT: no scleral icterus or conjunctival erythema  Skin: no suspicious lesions or rash. No jaundice.  CV: regular rhythm by palpation, no pedal edema  Resp: normal respiratory effort without conversational dyspnea   Psych: normal mood and affect  Gait: normal steady gait with appropriate coordination and balance  Neuro: normal light touch sensory exam of the extremities.    MSK:    LEFT KNEE  Inspection:    Normal alignment; no edema, erythema, or ecchymosis present  Palpation:    Tender about the medial joint line. Remainder of bony and ligamentous landmarks are nontender.    No effusion is present    Patellofemoral crepitus is Absent  Range of Motion:     00 extension to 1350 flexion  Strength:    Quadriceps 5/5, hamstrings 5/5, gastrocsoleus 5/5 and tibialis anterior 5/5    Extensor mechanism intact  Special Tests:    Positive: MCL/valgus stress (0 & 30 deg), Lacy's    Negative: Patellar grind, LCL/varus stress (0 & 30 deg), Lachman's      Independent visualization of the below image:      Trevon Clarke MD, [facility] Sports and Orthopedic Care    Disclaimer: This note consists of symbols derived from keyboarding, dictation and/or voice recognition software. As a result, there may be errors in the script that have gone undetected. Please consider this when interpreting information found in this chart.          Again, thank you for allowing me to participate in the care of your patient.         Sincerely,        Trevon Clarke MD

## 2023-07-10 NOTE — PROGRESS NOTES
ASSESSMENT & PLAN    Laron was seen today for pain.    Diagnoses and all orders for this visit:    Sprain of medial collateral ligament of left knee, subsequent encounter    Peripheral tear of medial meniscus of left knee as current injury, subsequent encounter        # Acute Worsening Left Knee Pain: Laron Haro was seen today for follow-up no acute worsening left knee pain. Symptoms had been going on for one month pain worsening limiting his ability to work/walk. On examination there are positive findings of tenderness to palpation over the medial joint line, small joint effusion.  Reviewed MRI with patient showing nondisplaced medial meniscal tear, MCL sprain both of which likely contributing to his pain.  Overall condition improving and has physical therapy scheduled this week.  Given this we will extend work restrictions, continue physical therapy and follow-up in 3 weeks for repeat evaluation.  Plan otherwise as below.       Image Findings: Horizontal medial meniscus tear, nondisplaced, slight sprain of the MCL  Treatment: Activities as tolerated, continue physical therapy  Job: Letter written for restrictions extended for 3 more weeks  Medications/Injections: Limited tylenol/ibuprofen for pain for 1-2 weeks, none  Follow-up: 3 weeks for repeat evaluation and clearance for work    -----    SUBJECTIVE:  Laron Haro is a 37 year old male who is seen in follow-up for left knee pain. They were last seen 6/23/2023.  The patient is seen by themselves.    Since their last visit reports persisting left knee pain.  Here to review left knee MRI performed on 7/5/23 at [facility]. They indicate that their current pain level is mild. They have tried crutches, rest, activity avoidance.        Patient's past medical, surgical, social, and family histories were reviewed today and no changes are noted.    REVIEW OF SYSTEMS:  Constitutional: NEGATIVE for fever, chills, change in weight  Skin:  NEGATIVE for worrisome rashes, moles or lesions  GI/: NEGATIVE for bowel or bladder changes  Neuro: NEGATIVE for weakness, dizziness or paresthesias    OBJECTIVE:  Wt 83.9 kg (185 lb)   BMI 25.09 kg/m     General: healthy, alert and in no distress  HEENT: no scleral icterus or conjunctival erythema  Skin: no suspicious lesions or rash. No jaundice.  CV: regular rhythm by palpation, no pedal edema  Resp: normal respiratory effort without conversational dyspnea   Psych: normal mood and affect  Gait: normal steady gait with appropriate coordination and balance  Neuro: normal light touch sensory exam of the extremities.    MSK:    LEFT KNEE  Inspection:    Normal alignment; no edema, erythema, or ecchymosis present  Palpation:    Tender about the medial joint line. Remainder of bony and ligamentous landmarks are nontender.    No effusion is present    Patellofemoral crepitus is Absent  Range of Motion:     00 extension to 1350 flexion  Strength:    Quadriceps 5/5, hamstrings 5/5, gastrocsoleus 5/5 and tibialis anterior 5/5    Extensor mechanism intact  Special Tests:    Positive: MCL/valgus stress (0 & 30 deg), Lacy's    Negative: Patellar grind, LCL/varus stress (0 & 30 deg), Lachman's      Independent visualization of the below image:      Trevon Clarke MD, [facility] Sports and Orthopedic Care    Disclaimer: This note consists of symbols derived from keyboarding, dictation and/or voice recognition software. As a result, there may be errors in the script that have gone undetected. Please consider this when interpreting information found in this chart.

## 2023-07-13 ENCOUNTER — TELEPHONE (OUTPATIENT)
Dept: ORTHOPEDICS | Facility: CLINIC | Age: 38
End: 2023-07-13
Payer: COMMERCIAL

## 2023-07-13 NOTE — TELEPHONE ENCOUNTER
Patient asked me to let Dr. Clarke's team know there was paperwork being faxed to them regarding the extension or work limits to be forwarded to [employer] once filled out.    Patient says the fax number the form was sent to is [phone].    Please let patient know if you have received this paperwork, by my chart or a phone call.    Thank you!

## 2023-07-14 NOTE — TELEPHONE ENCOUNTER
Forms were received, completed and faxed to [employer].  [employer] message sent to patient.    Mabel Bello ATC

## 2023-07-18 ENCOUNTER — THERAPY VISIT (OUTPATIENT)
Dept: PHYSICAL THERAPY | Facility: CLINIC | Age: 38
End: 2023-07-18
Payer: COMMERCIAL

## 2023-07-18 DIAGNOSIS — M25.562 ACUTE PAIN OF LEFT KNEE: ICD-10-CM

## 2023-07-18 PROCEDURE — 97161 PT EVAL LOW COMPLEX 20 MIN: CPT | Mod: GP | Performed by: PHYSICAL THERAPIST

## 2023-07-18 PROCEDURE — 97110 THERAPEUTIC EXERCISES: CPT | Mod: GP | Performed by: PHYSICAL THERAPIST

## 2023-07-18 ASSESSMENT — ACTIVITIES OF DAILY LIVING (ADL)
STAND: ACTIVITY IS MINIMALLY DIFFICULT
WALK: ACTIVITY IS SOMEWHAT DIFFICULT
PAIN: THE SYMPTOM AFFECTS MY ACTIVITY MODERATELY
GIVING WAY, BUCKLING OR SHIFTING OF KNEE: THE SYMPTOM AFFECTS MY ACTIVITY MODERATELY
RAW_SCORE: 34
GO UP STAIRS: ACTIVITY IS SOMEWHAT DIFFICULT
SIT WITH YOUR KNEE BENT: ACTIVITY IS VERY DIFFICULT
HOW_WOULD_YOU_RATE_THE_OVERALL_FUNCTION_OF_YOUR_KNEE_DURING_YOUR_USUAL_DAILY_ACTIVITIES?: ABNORMAL
GO DOWN STAIRS: ACTIVITY IS SOMEWHAT DIFFICULT
RISE FROM A CHAIR: ACTIVITY IS SOMEWHAT DIFFICULT
KNEE_ACTIVITY_OF_DAILY_LIVING_SUM: 34
AS_A_RESULT_OF_YOUR_KNEE_INJURY,_HOW_WOULD_YOU_RATE_YOUR_CURRENT_LEVEL_OF_DAILY_ACTIVITY?: ABNORMAL
KNEEL ON THE FRONT OF YOUR KNEE: ACTIVITY IS FAIRLY DIFFICULT
LIMPING: THE SYMPTOM AFFECTS MY ACTIVITY MODERATELY
SWELLING: THE SYMPTOM AFFECTS MY ACTIVITY SLIGHTLY
STIFFNESS: THE SYMPTOM AFFECTS MY ACTIVITY MODERATELY
WEAKNESS: THE SYMPTOM AFFECTS MY ACTIVITY MODERATELY
KNEE_ACTIVITY_OF_DAILY_LIVING_SCORE: 48.57
SQUAT: ACTIVITY IS FAIRLY DIFFICULT
HOW_WOULD_YOU_RATE_THE_CURRENT_FUNCTION_OF_YOUR_KNEE_DURING_YOUR_USUAL_DAILY_ACTIVITIES_ON_A_SCALE_FROM_0_TO_100_WITH_100_BEING_YOUR_LEVEL_OF_KNEE_FUNCTION_PRIOR_TO_YOUR_INJURY_AND_0_BEING_THE_INABILITY_TO_PERFORM_ANY_OF_YOUR_USUAL_DAILY_ACTIVITIES?: 60

## 2023-07-18 NOTE — PROGRESS NOTES
PHYSICAL THERAPY EVALUATION  Type of Visit: Evaluation    See electronic medical record for Abuse and Falls Screening details.    Subjective       Presenting condition or subjective complaint: Left knee pain started about 1 month ago for no specific reason. Pain left knee limiting activities. Has been wearing brace on left knee,has tried different braces on knee, does help for support. Was on crutches initially, then weaned off.   Date of onset: 06/15/23 (md referral date)    Relevant medical history:     Dates & types of surgery:      Prior diagnostic imaging/testing results: MRI 7/5/2023 see report for full detail, pt has medial meniscal tear, MCl sprain   Prior therapy history for the same diagnosis, illness or injury: No      Prior Level of Function  Transfers: Independent  Ambulation: Independent  ADL: Independent      Living Environment  Social support: With a significant other or spouse   Type of home: [facility]   Stairs to enter the home: Yes 1 Is there a railing: Yes   Ramp: No   Stairs inside the home: Yes 1 Is there a railing: Yes   Help at home: None  Equipment owned: Crutches     Employment: Yes  , normally walks all day long, currently working 1/2 shifts.   Hobbies/Interests: Golf    Patient goals for therapy: Walk normally without pain, golf again         Objective   KNEE EVALUATION  PAIN: Pain Level at Rest: 0/10  Pain Level with Use: 6/10  Pain Frequency: intermittent  Pain is Exacerbated By: prolonged walking, sit to stand, twisting, stairs some pain, squatting  Pain is Relieved By: heat, soaking in tub.   POSTURE: WNL  GAIT:  Weightbearing Status: WBAT  Assistive Device(s): None, wearing basic brace left knee, especially at work  Gait Deviations: Stance time decreased  Stride length decreased  slight limp on left  BALANCE/PROPRIOCEPTION: Single Leg Stance Eyes Open (seconds): 30 sec right and left  ROM:   (Degrees) Left AROM Left PROM  Right AROM Right PROM   Knee Flexion 135  135     Knee Extension 0  0    Pain: pain during flexion mid range with slight click, then able to go to end range without pain.  Tightness with end range extension    STRENGTH:   Pain: - none + mild ++ moderate +++ severe  Strength Scale: 0-5/5 Left Right   Knee Flexion 5 5   Knee Extension 5- 5   Quad Set 5 5   Strength left hip flexion 5/5; hip abduction, extension 5/5; core stabilization slight pain with bridge  FLEXIBILITY: WNL  slight tightness left gastroc  SPECIAL TESTS:    Left Right   Apley's (Meniscus)     Lacy's (Meniscus) Positive    Tammy's (ITB/TFL)     Patellar Apprehension Test     Patella Tracking WNL    Ligamentous Stability     Anterior Drawer (ACL)     Posterior Drawer (PCL)     Prone Dial Test at 30 Deg and 90 Deg (PCL/PLC)     Valgus Stress Testing at 0 Deg and 30 Deg Positive,      Varus Stress Testing at 0 Deg and 30 Deg        FUNCTIONAL TESTS: Double Leg Squat: Able to perform full deep squat with pain and tends to shift weight to right with squat  PALPATION: WNL  JOINT MOBILITY: WNL    Assessment & Plan   CLINICAL IMPRESSIONS  Medical Diagnosis: acute pain left knee    Treatment Diagnosis: acute pain left knee   Impression/Assessment: Patient is a 37 year old male with left knee complaints.  The following significant findings have been identified: Pain, Decreased ROM/flexibility, Decreased joint mobility, Decreased strength, Impaired muscle performance and Decreased activity tolerance. These impairments interfere with their ability to perform self care tasks, work tasks, recreational activities, household chores, driving , household mobility and community mobility as compared to previous level of function.     Clinical Decision Making (Complexity):  Clinical Presentation: Stable/Uncomplicated  Clinical Presentation Rationale: based on medical and personal factors listed in PT evaluation  Clinical Decision Making (Complexity): Low complexity    PLAN OF CARE  Treatment  Interventions:  Interventions: Manual Therapy, Neuromuscular Re-education, Therapeutic Activity, Therapeutic Exercise, Self-Care/Home Management    Long Term Goals     PT Goal 1  Goal Identifier: Ambulation  Goal Description: Minutes patient will be able to  walk; on uneven terrain; on sharp inclines/declines; Able to make sharp turns; Ambulate without gait deviation 60 min  Rationale: to maximize safety and independence with performance of ADLs and functional tasks;to maximize safety and independence within the home;to maximize safety and independence within the community;to maximize safety and independence with transportation;to maximize safety and independence with self cares  PT Goal 2  Goal Identifier: Squatting/bending  Goal Description: Reduce pain with squatting to 0/10; Do a full squat  for proper body mechanics while performing housework; for proper body mechanics while performing yardwork and home maintenance; for proper body mechanics when lifting, personal hygiene, dressing  Rationale: to maximize safety and independence with performance of ADLs and functional tasks;to maximize safety and independence within the home;to maximize safety and independence within the community;to maximize safety and independence with transportation;to maximize safety and independence with self cares      Frequency of Treatment: 1/week x 4 weeks, then 2x/month x 8 weeks  Duration of Treatment: 12 weeks    Education Assessment:   Learner/Method: Patient;Pictures/Video;No Barriers to Learning  Education Comments: Educated pt on findings of the evaluation and reasoning for plan of care.  Pt was able to understand how treatment plan aligns with goals.    Risks and benefits of evaluation/treatment have been explained.   Patient/Family/caregiver agrees with Plan of Care.     Evaluation Time:     PT Eval, Low Complexity Minutes (10480): 20       Signing Clinician: Malia Ramsay PT

## 2023-10-20 PROBLEM — M25.562 ACUTE PAIN OF LEFT KNEE: Status: RESOLVED | Noted: 2023-07-18 | Resolved: 2023-10-20

## 2023-10-20 NOTE — PROGRESS NOTES
07/18/23 0500   Appointment Info   Signing clinician's name / credentials Malia Ramsay PT   Total/Authorized Visits E&T 8   Visits Used 1   Medical Diagnosis acute pain left knee   PT Tx Diagnosis acute pain left knee   Other pertinent information MRI 7/5/2023 showed left medial mensical tear, and MCL strain   Progress Note/Certification   Onset of illness/injury or Date of Surgery 06/15/23  (md referral date)   Therapy Frequency 1/week x 4 weeks, then 2x/month x 8 weeks   Predicted Duration 12 weeks   Progress Note Due Date 10/10/23   Progress Note Completed Date 07/18/23   GOALS   PT Goals 2   PT Goal 1   Goal Identifier Ambulation   Goal Description Minutes patient will be able to  walk; on uneven terrain; on sharp inclines/declines; Able to make sharp turns; Ambulate without gait deviation 60 min   Rationale to maximize safety and independence with performance of ADLs and functional tasks;to maximize safety and independence within the home;to maximize safety and independence within the community;to maximize safety and independence with transportation;to maximize safety and independence with self cares   PT Goal 2   Goal Identifier Squatting/bending   Goal Description Reduce pain with squatting to 0/10; Do a full squat  for proper body mechanics while performing housework; for proper body mechanics while performing yardwork and home maintenance; for proper body mechanics when lifting, personal hygiene, dressing   Rationale to maximize safety and independence with performance of ADLs and functional tasks;to maximize safety and independence within the home;to maximize safety and independence within the community;to maximize safety and independence with transportation;to maximize safety and independence with self cares   Subjective Report   Subjective Report see eval   Objective Measures   Objective Measures Objective Measure 1   Objective Measure 1   Objective Measure Knee AROM   Details 0-135 with slight  pain/tightness at end range of extension   Treatment Interventions (PT)   Interventions Therapeutic Procedure/Exercise   Therapeutic Procedure/Exercise   PTRx Ther Proc 1 Isometric Quad   PTRx Ther Proc 1 - Details with small towel roll x 10   PTRx Ther Proc 2 Quad Set Without Towel Roll Under Knee   PTRx Ther Proc 2 - Details without roll x 5   PTRx Ther Proc 3 Passive Knee Extension Stretch   PTRx Ther Proc 3 - Details x 10-15 sec x 2 with slight end range pain can add this in as quad sets feeling more comfortable   PTRx Ther Proc 4 Hip Flexion Straight Leg Raise   PTRx Ther Proc 4 - Details x 5 vc initially for quad set then lift. able to do with good form   PTRx Ther Proc 5 Hip Abduction Straight Leg Raise   PTRx Ther Proc 5 - Details x 10 vc to keep hip in line with body. foot in neutral with no knee pain   PTRx Ther Proc 6 Hip Extension Straight Leg Raise   PTRx Ther Proc 6 - Details alternating right and left x 10    PTRx Ther Proc 7 Functional Knee Extension with Tubing   PTRx Ther Proc 7 - Details red x 10 can progress to green once can do 20-30 reps with red easily   PTRx Ther Proc 8 Toe Raises   PTRx Ther Proc 8 - Details x 10 vc to tighten thigh then raise up   PTRx Ther Proc 9 Standing Gastroc Stretch   PTRx Ther Proc 9 - Details x 20 sec x 2  vc to keep knee straight   Therapeutic Procedures: strength, endurance, ROM, flexibillity minutes (63446) 25   Ther Proc 1 bridge   Ther Proc 1 - Details x 5 reps with some pain medial knee, did not add to HEP for now   Eval/Assessments   PT Eval, Low Complexity Minutes (43291) 20   Education   Learner/Method Patient;Pictures/Video;No Barriers to Learning   Education Comments Educated pt on findings of the evaluation and reasoning for plan of care.  Pt was able to understand how treatment plan aligns with goals.   Plan   Home program see PTRX   Updates to plan of care issued handouts for HEP and instructed in use of PTRX online   Plan for next session continue to  progress with left knee/LE strengthening including hip and core stabilization; add bridges if able   Total Session Time   Timed Code Treatment Minutes 25   Total Treatment Time (sum of timed and untimed services) 45         DISCHARGE  Reason for Discharge: Patient chooses to discontinue therapy.  Patient has failed to schedule further appointments.    Equipment Issued: none    Discharge Plan: Patient to continue home program.    Referring Provider:  Mabel Espino

## 2024-07-21 ENCOUNTER — HEALTH MAINTENANCE LETTER (OUTPATIENT)
Age: 39
End: 2024-07-21

## 2025-08-10 ENCOUNTER — HEALTH MAINTENANCE LETTER (OUTPATIENT)
Age: 40
End: 2025-08-10